# Patient Record
Sex: MALE | Race: AMERICAN INDIAN OR ALASKA NATIVE
[De-identification: names, ages, dates, MRNs, and addresses within clinical notes are randomized per-mention and may not be internally consistent; named-entity substitution may affect disease eponyms.]

---

## 2017-10-30 NOTE — EMERGENCY DEPARTMENT REPORT
ED Syncope HPI





- General


Chief Complaint: Syncope


Stated Complaint: SYNCOPE EPISODE


Time Seen by Provider: 10/30/17 19:59


Source: patient, family





- History of Present Illness


Initial Comments: 





Patient is 27-year-old male presents by EMS after one episode of seizure-like 

activity [place] a 

sudden he lost his consciousness and he started jerking.  Friend stated that 

discontinued for few seconds in patient with back to normal patient did not 

have any bladder incontinence patient now is back to normal.  This is a first 

episode.  No family history of seizure.


Timing/Prior Episodes: no prior history, single episode today


Precipitating Factors: Positive: none


Context: standing


Loss of Consciousness: brief (seconds)


Current Symptoms: back to normal.  denies: blurred vision, chest pain, 

diaphoresis, dizziness, headache, injury, lightheadedness, loss of bladder 

control, loss of bowel control, motionless, nausea, pale, shallow/rapid 

breathing, weak/absent pulse, weakness





- Related Data


Allergies/Adverse Reactions: 


Allergies





No Known Allergies Allergy (Verified 10/30/17 19:45)


 








Home Medications: 


Ambulatory Orders





No Known Home Medications [No Reported Home Medications]  10/30/17 











ED Review of Systems


ROS: 


Stated complaint: SYNCOPE EPISODE


Other details as noted in HPI





Comment: All other systems reviewed and negative


Constitutional: denies: chills, fever


Respiratory: denies: cough, orthopnea, shortness of breath, SOB with exertion


Cardiovascular: denies: chest pain, palpitations, dyspnea on exertion, edema, 

syncope, paroxysmal nocturnal dyspnea


Gastrointestinal: denies: abdominal pain, nausea, vomiting, diarrhea, 

constipation, hematemesis


Musculoskeletal: denies: back pain, joint swelling


Skin: denies: rash, change in color, change in hair/nails


Neurological: denies: headache, weakness, numbness, paresthesias





ED Past Medical Hx





- Past Medical History


Previous Medical History?: Yes


Hx Hypertension: Yes


Additional medical history: back pain





- Surgical History


Past Surgical History?: No





- Social History


Smoking Status: Never Smoker


Substance Use Type: None





- Medications


Home Medications: 


 Home Medications











 Medication  Instructions  Recorded  Confirmed  Last Taken  Type


 


No Known Home Medications [No  10/30/17 10/30/17 Unknown History





Reported Home Medications]     














ED Physical Exam





- General


Limitations: No Limitations


General appearance: alert, in no apparent distress





- Head


Head exam: Present: atraumatic, normocephalic





- Eye


Eye exam: Present: normal appearance, PERRL, EOMI


Pupils: Present: normal accommodation





- ENT


ENT exam: Present: normal exam, normal orophraynx, mucous membranes moist, TM's 

normal bilaterally





- Neck


Neck exam: Present: normal inspection, full ROM.  Absent: meningismus, 

lymphadenopathy, thyromegaly





- Respiratory


Respiratory exam: Present: normal lung sounds bilaterally.  Absent: respiratory 

distress, wheezes, rales, rhonchi, stridor, decreased breath sounds, prolonged 

expiratory





- Cardiovascular


Cardiovascular Exam: Present: regular rate, normal rhythm, normal heart sounds





- GI/Abdominal


GI/Abdominal exam: Present: soft, normal bowel sounds.  Absent: distended, 

tenderness, guarding, rebound, rigid, mass, bruit, pulsatile mass, hernia





- Extremities Exam


Extremities exam: Present: normal inspection, full ROM, normal capillary refill





- Back Exam


Back exam: Present: normal inspection, full ROM.  Absent: tenderness, CVA 

tenderness (R), CVA tenderness (L), paraspinal tenderness, vertebral tenderness





- Neurological Exam


Neurological exam: Present: alert, oriented X3, CN II-XII intact, normal gait





- Skin


Skin exam: Present: warm, intact, normal color





ED Course


 Vital Signs











  10/30/17





  19:45


 


Temperature 98.1 F


 


Pulse Rate 67


 


Respiratory 18





Rate 


 


Blood Pressure 147/91


 


O2 Sat by Pulse 99





Oximetry 














- Reevaluation(s)


Reevaluation #1: 





10/30/17 22:15


Patient remained asymptomatic in the ER nor any evidence of seizure activity.





ED Medical Decision Making





- Lab Data


Result diagrams: 


 10/30/17 20:42





 10/30/17 20:42





- EKG Data


-: EKG Interpreted by Me


EKG shows normal: sinus rhythm





- EKG Data


Interpretation: no acute changes





- Radiology Data


Radiology results: report reviewed





CT brain unremarkable





- Medical Decision Making





Patient stated that he is feeling better.  And for him this is a possibility of 

seizure versus just syncope he need to follow up with an neurologist in the 

next 2-3 days.


Critical care attestation.: 


If time is entered above; I have spent that time in minutes in the direct care 

of this critically ill patient, excluding procedure time.








ED Disposition


Clinical Impression: 


 Syncope and collapse





Disposition: DC-01 TO HOME OR SELFCARE


Is pt being admited?: No


Condition: Stable


Instructions:  Syncope (ED)


Referrals: 


PRIMARY CARE,MD [Primary Care Provider] - 3-5 Days

## 2017-10-30 NOTE — CAT SCAN REPORT
FINAL REPORT



PROCEDURE:  CT HEAD/BRAIN WO CON



TECHNIQUE:  Computerized tomography of the head was performed

without contrast material. 



HISTORY:  AMS 



COMPARISON:  No prior studies are available for comparison.



FINDINGS:  

Skull and scalp: Normal.



Paranasal sinuses: Normal.



Ventricles and subarachnoid spaces: Normal.



Cerebrum: No evidence of hemorrhage, acute infarction or mass .



Cerebellum and brainstem: No evidence of hemorrhage, acute

infarction or mass.



Vasculature: Normal.



Comments: None.



IMPRESSION:  

Normal Examination

## 2018-07-05 NOTE — EMERGENCY DEPARTMENT REPORT
ED Lower Extremity HPI





- General


Chief Complaint: Extremity Injury, Lower


Stated Complaint: ANKLE HURTS


Time Seen by Provider: 18 16:40


Source: patient


Mode of arrival: Ambulatory


Limitations: No Limitations





- History of Present Illness


Initial Comments: 


28-year-old male past medical history attention presents with complaint of one 

week of right ankle pain.  Patient denies any direct trauma to right ankle.  Is 

ambulatory without assistance.  States that he has been swimming lately may 

have strained his ankle while swimming.  Denies any fevers chills or any other 

injuries.  No calf swelling or calf tenderness.  Patient is ambulatory without 

assistance but states that when he steps on his right ankle he feels pain 

medially.


MD Complaint: ankle injury


Onset/Timin


-: week(s)


Injury: Ankle: Right


Type of Injury: hyperextension


Place: street/outdoors


Severity: moderate


Severity scale (0 -10): 5


Worsens With: movement, palpation





- Related Data


 Previous Rx's











 Medication  Instructions  Recorded  Last Taken  Type


 


Ibuprofen [Motrin] 800 mg PO Q8HR PRN #20 tablet 18 Unknown Rx











 Allergies











Allergy/AdvReac Type Severity Reaction Status Date / Time


 


No Known Allergies Allergy   Verified 10/30/17 19:45














ED Review of Systems


ROS: 


Stated complaint: ANKLE HURTS


Other details as noted in HPI





Constitutional: denies: chills, fever


Eyes: denies: eye pain, eye discharge, vision change


ENT: denies: ear pain, throat pain


Respiratory: denies: cough, shortness of breath, wheezing


Cardiovascular: denies: chest pain, palpitations


Endocrine: no symptoms reported


Gastrointestinal: denies: abdominal pain, nausea, diarrhea


Genitourinary: denies: urgency, dysuria


Musculoskeletal: as per HPI, arthralgia (1 week rigth ankle discomfort).  denies

: back pain, joint swelling


Skin: denies: rash, lesions


Neurological: denies: headache, weakness, paresthesias


Psychiatric: denies: anxiety, depression


Hematological/Lymphatic: denies: easy bleeding, easy bruising





ED Past Medical Hx





- Past Medical History


Hx Hypertension: Yes


Additional medical history: back pain





- Social History


Smoking Status: Never Smoker


Substance Use Type: None





- Medications


Home Medications: 


 Home Medications











 Medication  Instructions  Recorded  Confirmed  Last Taken  Type


 


Ibuprofen [Motrin] 800 mg PO Q8HR PRN #20 tablet 18  Unknown Rx














ED Physical Exam





- General


Limitations: No Limitations


General appearance: alert, in no apparent distress





- Head


Head exam: Present: atraumatic, normocephalic





- Eye


Eye exam: Present: normal appearance





- ENT


ENT exam: Present: mucous membranes moist





- Neck


Neck exam: Present: normal inspection





- Respiratory


Respiratory exam: Present: normal lung sounds bilaterally.  Absent: respiratory 

distress





- Cardiovascular


Cardiovascular Exam: Present: regular rate, normal rhythm.  Absent: systolic 

murmur, diastolic murmur, rubs, gallop





- GI/Abdominal


GI/Abdominal exam: Present: soft, normal bowel sounds





- Rectal


Rectal exam: Present: deferred





- Extremities Exam


Extremities exam: Present: normal inspection





- Expanded Lower Extremity Exam


  ** Right


Upper Leg exam: Present: normal inspection, full ROM


Knee exam: Present: normal inspection, full ROM


Lower Leg exam: Present: normal inspection, full ROM


Ankle exam: Present: full ROM (ankle plantar flexion dorsiflexion clinically 

intact.  Distal pulses including dorsalis pedis and posterior tibial pulses 

strong to palpation.  No signs of cellulitis or infection overlying ankle joint 

or foot.)


Foot/Toe exam: Present: normal inspection, full ROM (inversion eversion dorsi 

and plantarflexion intact)


Neuro vascular tendon exam: Present: no vascular compromise


Gait: Positive: antalgic





  __________________________














  __________________________





 1 - Slight pain on palpation








- Back Exam


Back exam: Present: normal inspection





- Neurological Exam


Neurological exam: Present: alert, oriented X3, CN II-XII intact, normal gait





- Expanded Neurological Exam


  ** Expanded


Patient oriented to: Present: person, place, time


Sensory exam: Upper Extremity Light Touch: Normal, Lower Extremity Light Touch: 

Normal


Motor strength exam: RUE: 5, LUE: 5, RLE: 5, LLE: 5


Best Eye Response ([place]): (4) open spontaneously


Best Motor Response ([place]): (6) obeys commands


Best Verbal Response ([place]): (5) oriented


[place] Total: 15





- Psychiatric


Psychiatric exam: Present: normal affect, normal mood





- Skin


Skin exam: Present: warm, dry, intact, normal color.  Absent: rash





ED Course





 Vital Signs











  07/05/18





  14:22


 


Temperature 98.1 F


 


Pulse Rate 61


 


Respiratory 16





Rate 


 


Blood Pressure 141/63


 


O2 Sat by Pulse 97





Oximetry 














ED Lower Extremity MDM





- Medical Decision Making


A/P: Right ankle sprain


1-pt can take several steps without significant difficulty


2-Ace wrap, RICE therapy, Motrin 800.


3-no clinical signs of infection or DVT on exam.  Distal pulses and range of 

motion clinically intact.


4-follow-up with orthopedics and primary care 


Critical care attestation.: 


If time is entered above; I have spent that time in minutes in the direct care 

of this critically ill patient, excluding procedure time.








ED Disposition


Clinical Impression: 


Right ankle pain


Qualifiers:


 Chronicity: acute Qualified Code(s): M25.571 - Pain in right ankle and joints 

of right foot





Disposition: - TO HOME OR SELFCARE


Is pt being admited?: No


Does the pt Need Aspirin: No


Condition: Stable


Instructions:  RICE Therapy (ED), Arthralgia (ED), Ankle Sprain (ED)


Prescriptions: 


Ibuprofen [Motrin] 800 mg PO Q8HR PRN #20 tablet


 PRN Reason: Pain , Severe (7-10)


Referrals: 


SADIA MOSQUEDA MD [Staff Physician] - 3-5 Days


[facility] [Provider Group] - 3-5 Days


Forms:  Work/School Release Form(ED)


Time of Disposition: 16:46

## 2018-07-05 NOTE — XRAY REPORT
RIGHT ANKLE, 3 views: 



History: right ankle pain.



Bone mineralization is normal.  No acute osseous abnormality or joint 

pathology is identified.  The soft tissues are unremarkable.



IMPRESSION:

Normal study.

## 2018-10-19 NOTE — CAT SCAN REPORT
FINAL REPORT



PROCEDURE:  CT ABDOMEN PELVIS WO CON



TECHNIQUE:  Computerized axial tomography of the abdomen and

pelvis was performed without intravenous contrast. This study is

performed without intravascular contrast material and its

sensitivity for abdominal and pelvic pathology, including

neoplasms, inflammation, abscess, free fluid, thrombosis,

arterial dissection and infarction, is reduced compared with a

contrast enhanced study. 



HISTORY:  right flank pain nausea vomiting 



COMPARISON:  No prior studies are available for comparison.



FINDINGS:  

Visualized lower thorax: No significant abnormality.



Liver: Normal size and attenuation.



Spleen: Normal size and attenuation.



Gallbladder and biliary system: Normal.



Pancreas: Normal.



Adrenals: Normal.



Kidneys: There are no kidney stones or ureteral stones. There is

no hydronephrosis..



GI tract: There is no bowel obstruction, colitis or enteritis.

The appendix is normal..



Lymph nodes and mesentery: Normal.



Vasculature: Normal.



Bladder: Normal.



Reproductive organs: Normal.



Peritoneum: There is no ascites or free air, abscess or

adenopathy..



Musculoskeletal structures: No significant abnormality.



Other: None.



IMPRESSION:  

There are no kidney stones or ureteral stones. There is no

hydronephrosis.



There is no bowel obstruction, colitis or enteritis. The appendix

is normal.



There is no ascites or free air, abscess or adenopathy.



.

## 2018-10-19 NOTE — EMERGENCY DEPARTMENT REPORT
HPI





- General


Chief Complaint: Abdominal Pain


Time Seen by Provider: 10/19/18 01:18





- [facility] [place]


HPI: 





Room 3





The patient is a 28-year-old male presenting with chief complaint of right 

flank pain.  The patient states for the past 5 days he right flank pain.  The 

patient states the pain was initially intermittent but has now today become 

constant.  The patient admits to nausea and vomiting.  Patient denies dysuria, 

hematuria or fever.  Patient denies previous episodes of the same.  The patient 

gives his pain a score of 10/10





Location: Right flank


Duration: 5 days


Quality: Pain


Severity: 10/10


Modifying factors: [see above]


Context: [see above]


Mode of transportation: [not driving]





ED Past Medical Hx





- Past Medical History


Hx Hypertension: Yes


Additional medical history: back pain





- Surgical History


Past Surgical History?: No





- Family History


Family history: no significant





- Social History


Smoking Status: Never Smoker


Substance Use Type: None (denies illicit drug use)





- Medications


Home Medications: 


 Home Medications











 Medication  Instructions  Recorded  Confirmed  Last Taken  Type


 


Ibuprofen [Motrin] 800 mg PO Q8HR PRN #20 tablet 18  Unknown Rx


 


HYDROcodone/APAP 5-325 [[place] 1 - 2 each PO Q6HR PRN #14 tablet 10/19/18  

Unknown Rx





5/325]     


 


Ibuprofen [Motrin 800 MG tab] 800 mg PO Q8HR PRN #20 tablet 10/19/18  Unknown Rx


 


Promethazine [Phenergan TAB] 25 mg PO Q6HR PRN #20 tab 10/19/18  Unknown Rx


 


levoFLOXacin [Levaquin TAB] 500 mg PO QDAY #7 tablet 10/19/18  Unknown Rx














ED Review of Systems


ROS: 


Stated complaint: RT FLANK PAIN


Other details as noted in HPI





Constitutional: denies: fever


Eyes: denies: eye pain


ENT: denies: throat pain


Respiratory: no symptoms reported


Cardiovascular: denies: chest pain


Endocrine: no symptoms reported


Gastrointestinal: abdominal pain (right flank), nausea, vomiting


Genitourinary: denies: dysuria, hematuria


Musculoskeletal: back pain


Neurological: denies: headache





Physical Exam





- Physical Exam


Vital Signs: 


 Vital Signs











  10/18/18 10/19/18 10/19/18





  23:48 00:07 00:53


 


Temperature 98.7 F  97.7 F


 


Pulse Rate 63  56 L


 


Respiratory 16  18





Rate   


 


Blood Pressure 137/80  


 


Blood Pressure   131/93





[Left]   


 


O2 Sat by Pulse 99 100 98





Oximetry   











Physical Exam: 





GENERAL: The patient is well-developed well-nourished male sitting on stretcher 

not appearing to be in acute distress. []


HEENT: Normocephalic.  Atraumatic.  Extraocular motions are intact.  Patient 

has moist mucous membranes.


NECK: Supple.  Trachea midline


CHEST/LUNGS: Clear to auscultation.  There is no respiratory distress noted.


HEART/CARDIOVASCULAR: Regular.  There is no tachycardia.  There is no gallop 

rub or murmur.


ABDOMEN: Abdomen is soft, nontender.  Patient has normal bowel sounds.  There 

is no abdominal distention.


SKIN: There is no rash.  There is no edema.  There is no diaphoresis.


NEURO: The patient is awake, alert, and oriented.  The patient is cooperative.  

The patient has normal speech 


MUSCULOSKELETAL: There is no CVA tenderness bilaterally.  There is no evidence 

of acute injury.





ED Course


 Vital Signs











  10/18/18 10/19/18 10/19/18





  23:48 00:07 00:53


 


Temperature 98.7 F  97.7 F


 


Pulse Rate 63  56 L


 


Respiratory 16  18





Rate   


 


Blood Pressure 137/80  


 


Blood Pressure   131/93





[Left]   


 


O2 Sat by Pulse 99 100 98





Oximetry   














ED Medical Decision Making





- Lab Data


Result diagrams: 


 10/19/18 00:19





 10/19/18 00:19





 Laboratory Tests











  10/19/18 10/19/18 10/19/18





  00:19 00:19 Unknown


 


WBC  6.5  


 


RBC  4.75  


 


Hgb  13.9  


 


Hct  41.9  


 


MCV  88  


 


MCH  29  


 


MCHC  33  


 


RDW  13.1 L  


 


Plt Count  162  


 


Lymph % (Auto)  41.2 H  


 


Mono % (Auto)  8.3 H  


 


Eos % (Auto)  3.8  


 


Baso % (Auto)  0.5  


 


Lymph #  2.7  


 


Mono #  0.5  


 


Eos #  0.2  


 


Baso #  0.0  


 


Seg Neutrophils %  46.2  


 


Seg Neutrophils #  3.0  


 


Sodium   139 


 


Potassium   4.4 


 


Chloride   104.8 


 


Carbon Dioxide   25 


 


Anion Gap   14 


 


BUN   15 


 


Creatinine   0.7 L 


 


Estimated GFR   > 60 


 


BUN/Creatinine Ratio   21 


 


Glucose   91 


 


Calcium   9.5 


 


Urine Color    Yellow


 


Urine Turbidity    Clear


 


Urine pH    6.0


 


Ur Specific Gravity    1.025


 


Urine Protein    <15 mg/dl


 


Urine Glucose (UA)    Neg


 


Urine Ketones    Neg


 


Urine Blood    Neg


 


Urine Nitrite    Neg


 


Urine Bilirubin    Neg


 


Urine Urobilinogen    2.0


 


Ur Leukocyte Esterase    Tr


 


Urine WBC (Auto)    12.0 H


 


Urine RBC (Auto)    2.0


 


U Epithel Cells (Auto)    1.0


 


Urine Mucus    Few














- Radiology Data


Radiology results: report reviewed (CT abdomen and pelvis), image reviewed (CT 

abdomen and pelvis)





[facility] 


[address] [place] [address] 





Cat Scan Report 


Signed 





Patient: KANU MCKOY MR#: [id number] 


: 1990 Acct:[id number] 


Age/Sex: 28 / M ADM Date: 10/18/18 


Loc: ED 


Attending Dr: 








Ordering Physician: KLARISSA HODGSON MD 


Date of Service: 10/19/18 


Procedure(s): CT abdomen pelvis wo con 


Accession Number(s): [id number] 





cc: KLARSISA HODGSON MD 








FINAL REPORT 





PROCEDURE: CT ABDOMEN PELVIS WO CON 





TECHNIQUE: Computerized axial tomography of the abdomen and 


pelvis was performed without intravenous contrast. This study is 


performed without intravascular contrast material and its 


sensitivity for abdominal and pelvic pathology, including 


neoplasms, inflammation, abscess, free fluid, thrombosis, 


arterial dissection and infarction, is reduced compared with a 


contrast enhanced study. 





HISTORY: right flank pain nausea vomiting 





COMPARISON: No prior studies are available for comparison. 





FINDINGS: 


Visualized lower thorax: No significant abnormality. 





Liver: Normal size and attenuation. 





Spleen: Normal size and attenuation. 





Gallbladder and biliary system: Normal. 





Pancreas: Normal. 





Adrenals: Normal. 





Kidneys: There are no kidney stones or ureteral stones. There is 


no hydronephrosis.. 





GI tract: There is no bowel obstruction, colitis or enteritis. 


The appendix is normal.. 





Lymph nodes and mesentery: Normal. 





Vasculature: Normal. 





Bladder: Normal. 





Reproductive organs: Normal. 





Peritoneum: There is no ascites or free air, abscess or 


adenopathy.. 





Musculoskeletal structures: No significant abnormality. 





Other: None. 





IMPRESSION: 


There are no kidney stones or ureteral stones. There is no 


hydronephrosis. 





There is no bowel obstruction, colitis or enteritis. The appendix 


is normal. 





There is no ascites or free air, abscess or adenopathy. 





. 











Transcribed By: CO 


Dictated By: MARIO ALBERTO KOROMA MD 


Electronically Authenticated By: MARIO ALBERTO KOROMA MD 


Signed Date/Time: 10/19/18 0336 











DD/DT: 10/19/18 0336 


TD/TT: 10/19/18 033





- Differential Diagnosis


renal colic, pyelonephritis, appendicitis


Critical care attestation.: 


If time is entered above; I have spent that time in minutes in the direct care 

of this critically ill patient, excluding procedure time.








ED Disposition


Clinical Impression: 


 Acute right flank pain, Pyelonephritis





Disposition:  TO HOME OR SELFCARE


Is pt being admited?: No


Does the pt Need Aspirin: No


Condition: Stable


Instructions:  Flank Pain (ED)


Additional Instructions: 


Return to the emergency department immediately should you develop worsening 

symptoms, fever, inability to tolerate food or liquid or any other concerns.


Prescriptions: 


HYDROcodone/APAP 5-325 [[place] 5/325] 1 - 2 each PO Q6HR PRN #14 tablet


 PRN Reason: Pain


Ibuprofen [Motrin 800 MG tab] 800 mg PO Q8HR PRN #20 tablet


 PRN Reason: Pain, Moderate (4-6)


levoFLOXacin [Levaquin TAB] 500 mg PO QDAY #7 tablet


Promethazine [Phenergan TAB] 25 mg PO Q6HR PRN #20 tab


 PRN Reason: Nausea


Referrals: 


PRIMARY CARE,MD [Primary Care Provider] - 3-5 Days


JENNIFER WHEAT MD [Staff Physician] - 3-5 Days (Dr. Wheat is a urologist.  

Please follow-up with him for further evaluation)


Time of Disposition: 04:11

## 2019-01-31 ENCOUNTER — HOSPITAL ENCOUNTER (EMERGENCY)
Dept: HOSPITAL 5 - ED | Age: 29
LOS: 1 days | Discharge: HOME | End: 2019-02-01
Payer: MEDICARE

## 2019-01-31 DIAGNOSIS — I10: ICD-10-CM

## 2019-01-31 DIAGNOSIS — J02.9: Primary | ICD-10-CM

## 2019-01-31 PROCEDURE — 99283 EMERGENCY DEPT VISIT LOW MDM: CPT

## 2019-01-31 PROCEDURE — 71046 X-RAY EXAM CHEST 2 VIEWS: CPT

## 2019-01-31 NOTE — EMERGENCY DEPARTMENT REPORT
ED ENT HPI





- General


Chief complaint: Sore Throat


Stated complaint: LEFT SIDE PAIN,DIFF BREATHING,THROAT PAIN


Time Seen by Provider: 01/31/19 23:04


Source: patient


Mode of arrival: Ambulatory


Limitations: No Limitations





- History of Present Illness


Initial comments: 





28-year-old American male presents emergency department complaining of a 5 day 

history of cough with right rib discomfort and sore throat which is been non-

progressing.  No nausea, vomiting, diarrhea, fever, chills, sweats.  Mild nasal 

congestion has been noted.  Pain is worse with eating and swallowing coughing 

spells.


MD complaint: sore throat


Location: throat


Quality: aching, dull, constant


Consistency: constant


Improves with: none


Worsens with: none


Associated Symptoms: cough, sore throat, rhinorrhea.  denies: tinnitus, 

discharge from ear





- Related Data


                                  Previous Rx's











 Medication  Instructions  Recorded  Last Taken  Type


 


Ibuprofen [Motrin] 800 mg PO Q8HR PRN #20 tablet 07/05/18 Unknown Rx


 


Promethazine [Phenergan TAB] 25 mg PO Q6HR PRN #20 tab 10/19/18 Unknown Rx


 


levoFLOXacin [Levaquin TAB] 500 mg PO QDAY #7 tablet 10/19/18 Unknown Rx


 


HYDROcodone/APAP 5-325 [[place] 1 - 2 each PO Q6HR PRN #14 tablet 11/18/18 Unknown

 Rx





5-325 mg TAB]    


 


Ibuprofen [Motrin 800 MG tab] 800 mg PO Q8HR PRN #20 tablet 11/18/18 Unknown Rx


 


Ibuprofen [Motrin] 600 mg PO Q8H PRN #20 tablet 12/06/18 Unknown Rx


 


Famotidine [Pepcid] 40 mg PO DAILY #14 tablet 12/15/18 Unknown Rx


 


Hyoscyamine Subl [Levsin Sl 0.125 0.125 mg SL Q6HR PRN #20 tab 12/15/18 Unknown 

Rx





TAB]    


 


Amoxicillin 500 mg PO TID #21 capsule 01/31/19 Unknown Rx


 


Benzonatate [Tessalon Perles] 100 mg PO Q8HR #20 capsule 01/31/19 Unknown Rx


 


Chlorhexidine Mouthwash [Peridex] 15 ml MM BID #473 bottle 01/31/19 Unknown Rx


 


Lidocaine Viscous 2% 5 ml MM Q3H PRN #120 udc 01/31/19 Unknown Rx











                                    Allergies











Allergy/AdvReac Type Severity Reaction Status Date / Time


 


No Known Allergies Allergy   Verified 12/15/18 09:18














ED Dental HPI





- General


Chief complaint: Sore Throat


Stated complaint: LEFT SIDE PAIN,DIFF BREATHING,THROAT PAIN


Time Seen by Provider: 01/31/19 23:04


Source: patient


Mode of arrival: Ambulatory


Limitations: No Limitations





- Related Data


                                  Previous Rx's











 Medication  Instructions  Recorded  Last Taken  Type


 


Ibuprofen [Motrin] 800 mg PO Q8HR PRN #20 tablet 07/05/18 Unknown Rx


 


Promethazine [Phenergan TAB] 25 mg PO Q6HR PRN #20 tab 10/19/18 Unknown Rx


 


levoFLOXacin [Levaquin TAB] 500 mg PO QDAY #7 tablet 10/19/18 Unknown Rx


 


HYDROcodone/APAP 5-325 [[place] 1 - 2 each PO Q6HR PRN #14 tablet 11/18/18 Unknown

 Rx





5-325 mg TAB]    


 


Ibuprofen [Motrin 800 MG tab] 800 mg PO Q8HR PRN #20 tablet 11/18/18 Unknown Rx


 


Ibuprofen [Motrin] 600 mg PO Q8H PRN #20 tablet 12/06/18 Unknown Rx


 


Famotidine [Pepcid] 40 mg PO DAILY #14 tablet 12/15/18 Unknown Rx


 


Hyoscyamine Subl [Levsin Sl 0.125 0.125 mg SL Q6HR PRN #20 tab 12/15/18 Unknown 

Rx





TAB]    


 


Amoxicillin 500 mg PO TID #21 capsule 01/31/19 Unknown Rx


 


Benzonatate [Tessalon Perles] 100 mg PO Q8HR #20 capsule 01/31/19 Unknown Rx


 


Chlorhexidine Mouthwash [Peridex] 15 ml MM BID #473 bottle 01/31/19 Unknown Rx


 


Lidocaine Viscous 2% 5 ml MM Q3H PRN #120 udc 01/31/19 Unknown Rx











                                    Allergies











Allergy/AdvReac Type Severity Reaction Status Date / Time


 


No Known Allergies Allergy   Verified 12/15/18 09:18














ED Review of Systems


ROS: 


Stated complaint: LEFT SIDE PAIN,DIFF BREATHING,THROAT PAIN


Other details as noted in HPI





Constitutional: denies: chills, fever


Eyes: denies: eye pain, eye discharge, vision change


ENT: throat pain.  denies: ear pain


Respiratory: cough.  denies: shortness of breath, wheezing


Cardiovascular: denies: chest pain, palpitations


Endocrine: no symptoms reported


Gastrointestinal: denies: abdominal pain, nausea, diarrhea


Genitourinary: denies: urgency, dysuria


Musculoskeletal: denies: back pain, joint swelling, arthralgia


Skin: denies: rash, lesions


Neurological: denies: headache, weakness, paresthesias


Psychiatric: denies: anxiety, depression


Hematological/Lymphatic: denies: easy bleeding, easy bruising





ED Past Medical Hx





- Past Medical History


Previous Medical History?: No


Hx Hypertension: Yes


Additional medical history: back pain





- Surgical History


Past Surgical History?: No





- Social History


Smoking Status: Never Smoker


Substance Use Type: None





- Medications


Home Medications: 


                                Home Medications











 Medication  Instructions  Recorded  Confirmed  Last Taken  Type


 


Ibuprofen [Motrin] 800 mg PO Q8HR PRN #20 tablet 07/05/18  Unknown Rx


 


Promethazine [Phenergan TAB] 25 mg PO Q6HR PRN #20 tab 10/19/18  Unknown Rx


 


levoFLOXacin [Levaquin TAB] 500 mg PO QDAY #7 tablet 10/19/18  Unknown Rx


 


HYDROcodone/APAP 5-325 [[place] 1 - 2 each PO Q6HR PRN #14 tablet 11/18/18  

Unknown Rx





5-325 mg TAB]     


 


Ibuprofen [Motrin 800 MG tab] 800 mg PO Q8HR PRN #20 tablet 11/18/18  Unknown Rx


 


Ibuprofen [Motrin] 600 mg PO Q8H PRN #20 tablet 12/06/18  Unknown Rx


 


Famotidine [Pepcid] 40 mg PO DAILY #14 tablet 12/15/18  Unknown Rx


 


Hyoscyamine Subl [Levsin Sl 0.125 0.125 mg SL Q6HR PRN #20 tab 12/15/18  Unknown

 Rx





TAB]     


 


Amoxicillin 500 mg PO TID #21 capsule 01/31/19  Unknown Rx


 


Benzonatate [Tessalon Perles] 100 mg PO Q8HR #20 capsule 01/31/19  Unknown Rx


 


Chlorhexidine Mouthwash [Peridex] 15 ml MM BID #473 bottle 01/31/19  Unknown Rx


 


Lidocaine Viscous 2% 5 ml MM Q3H PRN #120 udc 01/31/19  Unknown Rx














ED Physical Exam





- General


Limitations: No Limitations


General appearance: alert, in no apparent distress





- Head


Head exam: Present: atraumatic, normocephalic





- Eye


Eye exam: Present: normal appearance, PERRL, EOMI


Pupils: Present: normal accommodation





- ENT


ENT exam: Present: normal exam, mucous membranes moist, other (pharynx with 

erythematous, no exudate.  Airway is patent.  Tongue and uvula midline.)





- Neck


Neck exam: Present: normal inspection, lymphadenopathy





- Respiratory


Respiratory exam: Present: normal lung sounds bilaterally, other (respiratory 

rate was 16).  Absent: respiratory distress, wheezes, rales, rhonchi, stridor





- Cardiovascular


Cardiovascular Exam: Present: regular rate, normal rhythm.  Absent: systolic 

murmur, diastolic murmur, rubs, gallop





- GI/Abdominal


GI/Abdominal exam: Present: soft, normal bowel sounds





- Rectal


Rectal exam: Present: deferred





- Extremities Exam


Extremities exam: Present: normal inspection





- Back Exam


Back exam: Present: normal inspection





- Neurological Exam


Neurological exam: Present: alert, oriented X3





- Psychiatric


Psychiatric exam: Present: normal affect, normal mood





- Skin


Skin exam: Present: warm, dry, intact, normal color.  Absent: rash





ED Course


                                   Vital Signs











  01/31/19





  21:24


 


Temperature 98.8 F


 


Pulse Rate 65


 


Blood Pressure 127/80


 


O2 Sat by Pulse 99





Oximetry 











Critical care attestation.: 


If time is entered above; I have spent that time in minutes in the direct care 

of this critically ill patient, excluding procedure time.








ED Disposition


Clinical Impression: 


 Sore throat, Cough





Disposition: DC-01 TO HOME OR SELFCARE


Is pt being admited?: No


Does the pt Need Aspirin: No


Condition: Stable


Instructions:  Pharyngitis (ED), Cold Symptoms (ED), Acute Cough (ED)


Prescriptions: 


Amoxicillin 500 mg PO TID #21 capsule


Benzonatate [Tessalon Perles] 100 mg PO Q8HR #20 capsule


Chlorhexidine Mouthwash [Peridex] 15 ml MM BID #473 bottle


Lidocaine Viscous 2% 5 ml MM Q3H PRN #120 udc


 PRN Reason: Pain, Moderate (4-6)


Referrals: 


[facility] [Provider Group] - 3-5 Days

## 2019-02-01 VITALS — DIASTOLIC BLOOD PRESSURE: 76 MMHG | SYSTOLIC BLOOD PRESSURE: 128 MMHG

## 2019-02-17 ENCOUNTER — HOSPITAL ENCOUNTER (EMERGENCY)
Dept: HOSPITAL 5 - ED | Age: 29
Discharge: LEFT BEFORE DISCHARGE | End: 2019-02-17
Payer: MEDICARE

## 2019-02-17 PROCEDURE — 99281 EMR DPT VST MAYX REQ PHY/QHP: CPT

## 2019-02-17 NOTE — EMERGENCY DEPARTMENT REPORT
Blank Doc





- Documentation


Documentation: 





This is a 28-year-old male that presents with a dog bite to left knee.  Patient 

denies knowing if UTD with vaccines.  Denies any other injuries or trauma.





This initial assessment diagnostic orders/clinical plan/treatment(s) is/are 

subject to change based on patient's health status, clinical progression and re-

assessment by fellow clinical providers in the ED.  Further treatment and workup

at subsequent clinical providers discretion.  Patient/guardians urged not to 

elope from ED s their condition may be serious if not clinically assessed and 

managed.  Initial orders include:


1-patient sent to [facility] for further evaluation and treatment.


2- animal control to be notified.

## 2019-02-28 ENCOUNTER — HOSPITAL ENCOUNTER (EMERGENCY)
Dept: HOSPITAL 5 - ED | Age: 29
Discharge: HOME | End: 2019-02-28
Payer: MEDICARE

## 2019-02-28 VITALS — DIASTOLIC BLOOD PRESSURE: 77 MMHG | SYSTOLIC BLOOD PRESSURE: 120 MMHG
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
## 2019-02-28 DIAGNOSIS — S39.012A: Primary | ICD-10-CM
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
## 2019-02-28 LAB
BILIRUB UR QL STRIP: (no result)
BLOOD UR QL VISUAL: (no result)
MUCOUS THREADS #/AREA URNS HPF: (no result) /HPF
PH UR STRIP: 6 [PH] (ref 5–7)
PROT UR STRIP-MCNC: (no result) MG/DL
RBC #/AREA URNS HPF: < 1 /HPF (ref 0–6)
UROBILINOGEN UR-MCNC: < 2 MG/DL (ref ?–2)
WBC #/AREA URNS HPF: 1 /HPF (ref 0–6)

## 2019-02-28 PROCEDURE — 96372 THER/PROPH/DIAG INJ SC/IM: CPT

## 2019-02-28 PROCEDURE — 99283 EMERGENCY DEPT VISIT LOW MDM: CPT

## 2019-02-28 PROCEDURE — 81001 URINALYSIS AUTO W/SCOPE: CPT

## 2019-02-28 NOTE — EMERGENCY DEPARTMENT REPORT
ED Back Pain/Injury HPI





- General


Chief Complaint: Back Pain/Injury


Stated Complaint: LOWER BACK PAIN


Time Seen by Provider: 19 05:20


Source: patient


Limitations: No Limitations





- History of Present Illness


Initial Comments: 


Patient is 28-year-old male who presents for left low back pain patient has 

history of chronic low back pain with sciatica states he strained bend and twist

last week and is out of pain medication usually treated with NSAIDs and muscle 

relaxants there is no numbness no tingling or paralysis no weakness loss or 

decrease in bowel or bladder function patient remains ambulatory with steady 

gait pain is rated as 6/10 burning aching 





MD Complaint: back pain


Onset/Timin


-: week(s)


Similar Symptoms Previously: Yes


Place: home


Radiation: left leg


Severity: moderate


Severity scale (0 -10): 5


Quality: burning, aching


Consistency: constant


Improves With: none


Worsens With: sitting upright, walking


Context: turning/twisting, bending


Associated Symptoms: denies: numbness, difficulty urinating, incontinence, 

fever/chills, constipation, headaches, syncope





- Related Data


                                  Previous Rx's











 Medication  Instructions  Recorded  Last Taken  Type


 


Ibuprofen [Motrin] 800 mg PO Q8HR PRN #20 tablet 18 Unknown Rx


 


Promethazine [Phenergan TAB] 25 mg PO Q6HR PRN #20 tab 10/19/18 Unknown Rx


 


levoFLOXacin [Levaquin TAB] 500 mg PO QDAY #7 tablet 10/19/18 Unknown Rx


 


HYDROcodone/APAP 5-325 [[place] 1 - 2 each PO Q6HR PRN #14 tablet 18 Unknown

 Rx





5-325 mg TAB]    


 


Ibuprofen [Motrin 800 MG tab] 800 mg PO Q8HR PRN #20 tablet 18 Unknown Rx


 


Ibuprofen [Motrin] 600 mg PO Q8H PRN #20 tablet 18 Unknown Rx


 


Famotidine [Pepcid] 40 mg PO DAILY #14 tablet 12/15/18 Unknown Rx


 


Hyoscyamine Subl [Levsin Sl 0.125 0.125 mg SL Q6HR PRN #20 tab 12/15/18 Unknown 

Rx





TAB]    


 


Amoxicillin 500 mg PO TID #21 capsule 19 Unknown Rx


 


Benzonatate [Tessalon Perles] 100 mg PO Q8HR #20 capsule 19 Unknown Rx


 


Chlorhexidine Mouthwash [Peridex] 15 ml MM BID #473 bottle 19 Unknown Rx


 


Lidocaine Viscous 2% 5 ml MM Q3H PRN #120 udc 19 Unknown Rx


 


Cyclobenzaprine [Flexeril] 10 mg PO TID PRN #30 tablet 19 Unknown Rx


 


Menthol/Camphor [Tiger Balm 1 applicatio TP QID PRN #1 tube 19 Unknown Rx





Ointment]    


 


Naproxen 500 mg PO BID PRN #30 tablet 19 Unknown Rx











                                    Allergies











Allergy/AdvReac Type Severity Reaction Status Date / Time


 


No Known Allergies Allergy   Verified 12/15/18 09:18














ED Review of Systems


ROS: 


Stated complaint: LOWER BACK PAIN


Other details as noted in HPI





Constitutional: denies: chills, fever


Eyes: denies: eye pain, eye discharge, vision change


ENT: denies: ear pain, throat pain


Respiratory: denies: cough, shortness of breath, wheezing


Cardiovascular: denies: chest pain, palpitations


Endocrine: no symptoms reported


Gastrointestinal: denies: abdominal pain, nausea, diarrhea


Genitourinary: denies: urgency, dysuria


Musculoskeletal: back pain, arthralgia, myalgia


Skin: denies: rash, lesions


Neurological: denies: headache, weakness, paresthesias


Psychiatric: denies: anxiety, depression


Hematological/Lymphatic: denies: easy bleeding, easy bruising





ED Past Medical Hx





- Past Medical History


Hx Hypertension: Yes


Additional medical history: back pain





- Surgical History


Past Surgical History?: No





- Social History


Smoking Status: Never Smoker


Substance Use Type: None





- Medications


Home Medications: 


                                Home Medications











 Medication  Instructions  Recorded  Confirmed  Last Taken  Type


 


Ibuprofen [Motrin] 800 mg PO Q8HR PRN #20 tablet 18  Unknown Rx


 


Promethazine [Phenergan TAB] 25 mg PO Q6HR PRN #20 tab 10/19/18  Unknown Rx


 


levoFLOXacin [Levaquin TAB] 500 mg PO QDAY #7 tablet 10/19/18  Unknown Rx


 


HYDROcodone/APAP 5-325 [[place] 1 - 2 each PO Q6HR PRN #14 tablet 18  

Unknown Rx





5-325 mg TAB]     


 


Ibuprofen [Motrin 800 MG tab] 800 mg PO Q8HR PRN #20 tablet 18  Unknown Rx


 


Ibuprofen [Motrin] 600 mg PO Q8H PRN #20 tablet 18  Unknown Rx


 


Famotidine [Pepcid] 40 mg PO DAILY #14 tablet 12/15/18  Unknown Rx


 


Hyoscyamine Subl [Levsin Sl 0.125 0.125 mg SL Q6HR PRN #20 tab 12/15/18  Unknown

 Rx





TAB]     


 


Amoxicillin 500 mg PO TID #21 capsule 19  Unknown Rx


 


Benzonatate [Tessalon Perles] 100 mg PO Q8HR #20 capsule 19  Unknown Rx


 


Chlorhexidine Mouthwash [Peridex] 15 ml MM BID #473 bottle 19  Unknown Rx


 


Lidocaine Viscous 2% 5 ml MM Q3H PRN #120 udc 19  Unknown Rx


 


Cyclobenzaprine [Flexeril] 10 mg PO TID PRN #30 tablet 19  Unknown Rx


 


Menthol/Camphor [Tiger Balm 1 applicatio TP QID PRN #1 tube 19  Unknown Rx





Ointment]     


 


Naproxen 500 mg PO BID PRN #30 tablet 19  Unknown Rx














ED Physical Exam





- General


Limitations: No Limitations


General appearance: alert, in no apparent distress





- Head


Head exam: Present: atraumatic, normocephalic





- Eye


Eye exam: Present: normal appearance, PERRL, EOMI


Pupils: Present: normal accommodation





- ENT


ENT exam: Present: normal orophraynx, mucous membranes moist





- Neck


Neck exam: Present: normal inspection, full ROM.  Absent: tenderness, 

meningismus, lymphadenopathy, thyromegaly





- Expanded Neck Exam


  ** Expanded


Neck exam: Absent: midline deformity, anterior neck swelling, thyroid mass





- Respiratory


Respiratory exam: Present: normal lung sounds bilaterally.  Absent: respiratory 

distress, wheezes, stridor, chest wall tenderness





- Cardiovascular


Cardiovascular Exam: Present: regular rate, normal rhythm, normal heart sounds. 

Absent: systolic murmur, diastolic murmur, rubs, gallop





- GI/Abdominal


GI/Abdominal exam: Present: soft, normal bowel sounds.  Absent: tenderness, 

guarding, rebound, bruit, hernia





- Rectal


Rectal exam: Present: deferred





- Extremities Exam


Extremities exam: Present: normal inspection, full ROM, normal capillary refill.

 Absent: tenderness, joint swelling





- Back Exam


Back exam: Present: normal inspection, full ROM, tenderness (no posterior 

vertebral point tender mild left lateral paraspinus tenderness pos straight leg 

right , no weakness no nubmness distal pulses intact ), muscle spasm, paraspinal

tenderness.  Absent: CVA tenderness (R), CVA tenderness (L), vertebral 

tenderness, rash noted





- Expanded Back Exam


  ** Expanded


Back exam: Absent: saddle anesthesia


Back exam: Positive Straight Leg Raise: Left, Negative Straight Leg Raising: 

Right





- Neurological Exam


Neurological exam: Present: alert, oriented X3, CN II-XII intact, normal gait, 

reflexes normal.  Absent: motor sensory deficit





- Psychiatric


Psychiatric exam: Present: normal affect, normal mood





- Skin


Skin exam: Present: warm, dry, intact, normal color.  Absent: rash





ED Course





                                   Vital Signs











  19





  02:10


 


Temperature 98.9 F


 


Pulse Rate 74


 


Respiratory 20





Rate 


 


Blood Pressure 120/77


 


O2 Sat by Pulse 100





Oximetry 














ED Medical Decision Making





- Lab Data








Labs











  19





  04:00


 


Urine Color  Yellow


 


Urine Turbidity  Clear


 


Urine pH  6.0


 


Ur Specific Gravity  1.019


 


Urine Protein  <15 mg/dl


 


Urine Glucose (UA)  Neg


 


Urine Ketones  Neg


 


Urine Blood  Neg


 


Urine Nitrite  Neg


 


Urine Bilirubin  Neg


 


Urine Urobilinogen  < 2.0


 


Ur Leukocyte Esterase  Neg


 


Urine WBC (Auto)  1.0


 


Urine RBC (Auto)  < 1.0


 


Urine Mucus  Few














- Medical Decision Making


this is a low back strain plain nsaids, muscle relaxants , analgesic balm, pt 

will follow up with ortho in 2-3 days, given referral to [facility] 

clinic in 2-3 days pt verbalized agreement and understanding with discharge plan







Critical care attestation.: 


If time is entered above; I have spent that time in minutes in the direct care 

of this critically ill patient, excluding procedure time.








ED Disposition


Clinical Impression: 


Low back strain


Qualifiers:


 Encounter type: initial encounter Qualified Code(s): S39.012A - Strain of 

muscle, fascia and tendon of lower back, initial encounter





Disposition:  TO HOME OR SELFCARE


Is pt being admited?: No


Does the pt Need Aspirin: No


Condition: Stable


Instructions:  Muscle Strain (ED), Low Back Strain (ED), Core Strengthening 

Exercises (GEN)


Prescriptions: 


Cyclobenzaprine [Flexeril] 10 mg PO TID PRN #30 tablet


 PRN Reason: Muscle Spasm


Menthol/Camphor [Tiger Balm Ointment] 1 applicatio TP QID PRN #1 tube


 PRN Reason: pain


Naproxen 500 mg PO BID PRN #30 tablet


 PRN Reason: Pain , Severe (7-10)


Referrals: 


SADIA MOSQUEDA MD [Staff Physician] - 3-5 Days


[facility] [Outside] - 3-5 Days


Forms:  Work/School Release Form(ED)


Time of Disposition: 05:36

## 2019-03-10 ENCOUNTER — HOSPITAL ENCOUNTER (EMERGENCY)
Dept: HOSPITAL 5 - ED | Age: 29
LOS: 1 days | Discharge: HOME | End: 2019-03-11
Payer: MEDICARE

## 2019-03-10 DIAGNOSIS — F41.1: Primary | ICD-10-CM

## 2019-03-10 DIAGNOSIS — I10: ICD-10-CM

## 2019-03-10 DIAGNOSIS — G40.909: ICD-10-CM

## 2019-03-10 DIAGNOSIS — F43.0: ICD-10-CM

## 2019-03-10 PROCEDURE — 93010 ELECTROCARDIOGRAM REPORT: CPT

## 2019-03-10 PROCEDURE — 85025 COMPLETE CBC W/AUTO DIFF WBC: CPT

## 2019-03-10 PROCEDURE — 80320 DRUG SCREEN QUANTALCOHOLS: CPT

## 2019-03-10 PROCEDURE — 80307 DRUG TEST PRSMV CHEM ANLYZR: CPT

## 2019-03-10 PROCEDURE — 82550 ASSAY OF CK (CPK): CPT

## 2019-03-10 PROCEDURE — G0480 DRUG TEST DEF 1-7 CLASSES: HCPCS

## 2019-03-10 PROCEDURE — 93005 ELECTROCARDIOGRAM TRACING: CPT

## 2019-03-10 PROCEDURE — 80053 COMPREHEN METABOLIC PANEL: CPT

## 2019-03-10 PROCEDURE — 70450 CT HEAD/BRAIN W/O DYE: CPT

## 2019-03-10 PROCEDURE — 99284 EMERGENCY DEPT VISIT MOD MDM: CPT

## 2019-03-10 PROCEDURE — 36415 COLL VENOUS BLD VENIPUNCTURE: CPT

## 2019-03-10 PROCEDURE — 81001 URINALYSIS AUTO W/SCOPE: CPT

## 2019-03-10 PROCEDURE — 83735 ASSAY OF MAGNESIUM: CPT

## 2019-03-10 PROCEDURE — 82962 GLUCOSE BLOOD TEST: CPT

## 2019-03-11 VITALS — SYSTOLIC BLOOD PRESSURE: 133 MMHG | DIASTOLIC BLOOD PRESSURE: 74 MMHG

## 2019-03-11 LAB
ALBUMIN SERPL-MCNC: 4.5 G/DL (ref 3.9–5)
ALT SERPL-CCNC: 36 UNITS/L (ref 7–56)
BASOPHILS # (AUTO): 0 K/MM3 (ref 0–0.1)
BASOPHILS NFR BLD AUTO: 0.2 % (ref 0–1.8)
BENZODIAZEPINES SCREEN,URINE: (no result)
BILIRUB UR QL STRIP: (no result)
BLOOD UR QL VISUAL: (no result)
BUN SERPL-MCNC: 13 MG/DL (ref 9–20)
BUN/CREAT SERPL: 14 %
CALCIUM SERPL-MCNC: 9.5 MG/DL (ref 8.4–10.2)
EOSINOPHIL # BLD AUTO: 0.2 K/MM3 (ref 0–0.4)
EOSINOPHIL NFR BLD AUTO: 2.7 % (ref 0–4.3)
HCT VFR BLD CALC: 42 % (ref 35.5–45.6)
HEMOLYSIS INDEX: 264
HGB BLD-MCNC: 14 GM/DL (ref 11.8–15.2)
HYALINE CASTS #/AREA URNS LPF: 7 /LPF
LYMPHOCYTES # BLD AUTO: 1.8 K/MM3 (ref 1.2–5.4)
LYMPHOCYTES NFR BLD AUTO: 24.2 % (ref 13.4–35)
MCHC RBC AUTO-ENTMCNC: 33 % (ref 32–34)
MCV RBC AUTO: 88 FL (ref 84–94)
METHADONE SCREEN,URINE: (no result)
MONOCYTES # (AUTO): 0.5 K/MM3 (ref 0–0.8)
MONOCYTES % (AUTO): 7.1 % (ref 0–7.3)
MUCOUS THREADS #/AREA URNS HPF: (no result) /HPF
OPIATE SCREEN,URINE: (no result)
PH UR STRIP: 5 [PH] (ref 5–7)
PLATELET # BLD: 165 K/MM3 (ref 140–440)
RBC # BLD AUTO: 4.8 M/MM3 (ref 3.65–5.03)
RBC #/AREA URNS HPF: 2 /HPF (ref 0–6)
UROBILINOGEN UR-MCNC: < 2 MG/DL (ref ?–2)
WBC #/AREA URNS HPF: 4 /HPF (ref 0–6)

## 2019-03-11 NOTE — CAT SCAN REPORT
PROCEDURE:  CT HEAD/BRAIN WO CON 

 

TECHNIQUE:  Computerized tomography of the head was performed without contrast material.  

 

CT DOSE LENGTH PRODUCT: mGycm 

 

HISTORY: Seizure vs syncope 

 

COMPARISONS:  10/30/2017 . 

 

FINDINGS: 

 

Skull and scalp:  Normal . 

Paranasal sinuses:  Normal . 

Ventricles and subarachnoid spaces:  Normal . 

Cerebrum:  No evidence of hemorrhage, acute infarction or mass . 

Cerebellum and brainstem:  No evidence of hemorrhage, acute infarction or mass . 

Vasculature:  Normal . 

 

IMPRESSION:  Normal Examination . 

 

This document is electronically signed by Nestor Aponte MD., March 11 2019 02:26:57 AM ET

## 2019-03-11 NOTE — EMERGENCY DEPARTMENT REPORT
HPI





- General


Chief Complaint: Seizure


Time Seen by Provider: 03/11/19 00:17





- HPI


HPI: 





28-year-old -American male presents to ED with the chief complaint of 

passing out, possible seizure.  Patient stated he was having an argument with 

his girlfriend when a black out completely, does not remember what happened for 

about a minute.  He denies any loss of bowel or urine.  Denies any fever, neck 

pain, chest pain, shortness of breath.  Patient said he wants had a seizure 

about a few years ago but does not remember when.





ED Past Medical Hx





- Past Medical History


Previous Medical History?: Yes


Hx Hypertension: Yes


Hx Seizures: Yes (epilepsy)


Additional medical history: back pain





- Surgical History


Past Surgical History?: No





- Social History


Smoking Status: Never Smoker


Substance Use Type: Prescribed





- Medications


Home Medications: 


                                Home Medications











 Medication  Instructions  Recorded  Confirmed  Last Taken  Type


 


Ibuprofen [Motrin] 800 mg PO Q8HR PRN #20 tablet 07/05/18  Unknown Rx


 


Promethazine [Phenergan TAB] 25 mg PO Q6HR PRN #20 tab 10/19/18  Unknown Rx


 


levoFLOXacin [Levaquin TAB] 500 mg PO QDAY #7 tablet 10/19/18  Unknown Rx


 


HYDROcodone/APAP 5-325 [[place] 1 - 2 each PO Q6HR PRN #14 tablet 11/18/18  

Unknown Rx





5-325 mg TAB]     


 


Ibuprofen [Motrin 800 MG tab] 800 mg PO Q8HR PRN #20 tablet 11/18/18  Unknown Rx


 


Ibuprofen [Motrin] 600 mg PO Q8H PRN #20 tablet 12/06/18  Unknown Rx


 


Famotidine [Pepcid] 40 mg PO DAILY #14 tablet 12/15/18  Unknown Rx


 


Hyoscyamine Subl [Levsin Sl 0.125 0.125 mg SL Q6HR PRN #20 tab 12/15/18  Unknown

 Rx





TAB]     


 


Amoxicillin 500 mg PO TID #21 capsule 01/31/19  Unknown Rx


 


Benzonatate [Tessalon Perles] 100 mg PO Q8HR #20 capsule 01/31/19  Unknown Rx


 


Chlorhexidine Mouthwash [Peridex] 15 ml MM BID #473 bottle 01/31/19  Unknown Rx


 


Lidocaine Viscous 2% 5 ml MM Q3H PRN #120 udc 01/31/19  Unknown Rx


 


Cyclobenzaprine [Flexeril] 10 mg PO TID PRN #30 tablet 02/28/19  Unknown Rx


 


Menthol/Camphor [Tiger Balm 1 applicatio TP QID PRN #1 tube 02/28/19  Unknown Rx





Ointment]     


 


Naproxen 500 mg PO BID PRN #30 tablet 02/28/19  Unknown Rx














ED Review of Systems


ROS: 


Stated complaint: POSS SYNCOPE/SEIZURE


Other details as noted in HPI








Physical Exam





- Physical Exam


Vital Signs: 


                                   Vital Signs











  03/11/19





  00:07


 


Temperature 98.1 F


 


Pulse Rate 109 H


 


Respiratory 24





Rate 


 


Blood Pressure 131/84


 


O2 Sat by Pulse 98





Oximetry 














ED Course


                                   Vital Signs











  03/11/19





  00:07


 


Temperature 98.1 F


 


Pulse Rate 109 H


 


Respiratory 24





Rate 


 


Blood Pressure 131/84


 


O2 Sat by Pulse 98





Oximetry 














ED Medical Decision Making





- Lab Data


Result diagrams: 


                                 03/11/19 00:19





                                 03/11/19 00:19





- Medical Decision Making





28-year-old male who blacked out after argument with his girlfriend, I suspect 

this will likely be anxiety, or stress related.  But we will rule out other 

significant pathology by getting a CT head, CBC, chemistry, chest x-ray, UA, 

drug screen.





Serial examinations throughout this ER visit.  Were within normal limits, CT 

head, work were all negative, will DC patient home with follow-up with PCP.





- Differential Diagnosis


differential diagnoses include, seizures, anxiety stress reaction, syncope


Critical care attestation.: 


If time is entered above; I have spent that time in minutes in the direct care 

of this critically ill patient, excluding procedure time.








ED Disposition


Clinical Impression: 


 Anxiety in acute stress reaction





Syncope


Qualifiers:


 Syncope type: unspecified Qualified Code(s): R55 - Syncope and collapse





Disposition: DC-01 TO HOME OR SELFCARE


Is pt being admited?: No


Does the pt Need Aspirin: No


Condition: Stable


Instructions:  Syncope (ED)


Referrals: 


PRIMARY CARE,MD [Primary Care Provider] - 3-5 Days

## 2020-07-15 ENCOUNTER — HOSPITAL ENCOUNTER (EMERGENCY)
Dept: HOSPITAL 5 - ED | Age: 30
Discharge: HOME | End: 2020-07-15
Payer: MEDICARE

## 2020-07-15 VITALS — DIASTOLIC BLOOD PRESSURE: 90 MMHG | SYSTOLIC BLOOD PRESSURE: 142 MMHG
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
## 2020-07-15 DIAGNOSIS — I10: ICD-10-CM

## 2020-07-15 DIAGNOSIS — Z86.69: ICD-10-CM

## 2020-07-15 DIAGNOSIS — Z79.1: ICD-10-CM
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
## 2020-07-15 DIAGNOSIS — Z79.899: ICD-10-CM

## 2020-07-15 DIAGNOSIS — T20.00XA: Primary | ICD-10-CM

## 2020-07-15 PROCEDURE — 99281 EMR DPT VST MAYX REQ PHY/QHP: CPT

## 2020-07-15 PROCEDURE — 90715 TDAP VACCINE 7 YRS/> IM: CPT

## 2020-07-15 PROCEDURE — 90471 IMMUNIZATION ADMIN: CPT
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
